# Patient Record
Sex: FEMALE | Race: WHITE
[De-identification: names, ages, dates, MRNs, and addresses within clinical notes are randomized per-mention and may not be internally consistent; named-entity substitution may affect disease eponyms.]

---

## 2018-08-05 ENCOUNTER — HOSPITAL ENCOUNTER (EMERGENCY)
Dept: HOSPITAL 62 - ER | Age: 18
LOS: 1 days | Discharge: HOME | End: 2018-08-06
Payer: OTHER GOVERNMENT

## 2018-08-05 DIAGNOSIS — Z88.8: ICD-10-CM

## 2018-08-05 DIAGNOSIS — O21.9: Primary | ICD-10-CM

## 2018-08-05 DIAGNOSIS — Z3A.00: ICD-10-CM

## 2018-08-05 LAB
ADD MANUAL DIFF: NO
ALBUMIN SERPL-MCNC: 4 G/DL (ref 3.5–5)
ALP SERPL-CCNC: 54 U/L (ref 38–126)
ALT SERPL-CCNC: 22 U/L (ref 9–52)
ANION GAP SERPL CALC-SCNC: 13 MMOL/L (ref 5–19)
APPEARANCE UR: (no result)
APTT PPP: YELLOW S
AST SERPL-CCNC: 17 U/L (ref 14–36)
BASOPHILS # BLD AUTO: 0.1 10^3/UL (ref 0–0.2)
BASOPHILS NFR BLD AUTO: 0.6 % (ref 0–2)
BILIRUB DIRECT SERPL-MCNC: 0.2 MG/DL (ref 0–0.4)
BILIRUB SERPL-MCNC: 0.3 MG/DL (ref 0.2–1.3)
BILIRUB UR QL STRIP: NEGATIVE
BUN SERPL-MCNC: 7 MG/DL (ref 7–20)
CALCIUM: 9.3 MG/DL (ref 8.4–10.2)
CHLORIDE SERPL-SCNC: 105 MMOL/L (ref 98–107)
CO2 SERPL-SCNC: 22 MMOL/L (ref 22–30)
EOSINOPHIL # BLD AUTO: 0.1 10^3/UL (ref 0–0.6)
EOSINOPHIL NFR BLD AUTO: 1.1 % (ref 0–6)
ERYTHROCYTE [DISTWIDTH] IN BLOOD BY AUTOMATED COUNT: 13.4 % (ref 11.5–14)
GLUCOSE SERPL-MCNC: 79 MG/DL (ref 75–110)
GLUCOSE UR STRIP-MCNC: NEGATIVE MG/DL
HCT VFR BLD CALC: 38.9 % (ref 36–47)
HGB BLD-MCNC: 13.3 G/DL (ref 12–15.5)
KETONES UR STRIP-MCNC: 20 MG/DL
LIPASE SERPL-CCNC: 52.4 U/L (ref 23–300)
LYMPHOCYTES # BLD AUTO: 3.4 10^3/UL (ref 0.5–4.7)
LYMPHOCYTES NFR BLD AUTO: 33.5 % (ref 13–45)
MCH RBC QN AUTO: 29 PG (ref 27–33.4)
MCHC RBC AUTO-ENTMCNC: 34.2 G/DL (ref 32–36)
MCV RBC AUTO: 85 FL (ref 80–97)
MONOCYTES # BLD AUTO: 0.8 10^3/UL (ref 0.1–1.4)
MONOCYTES NFR BLD AUTO: 7.7 % (ref 3–13)
NEUTROPHILS # BLD AUTO: 5.8 10^3/UL (ref 1.7–8.2)
NEUTS SEG NFR BLD AUTO: 57.1 % (ref 42–78)
NITRITE UR QL STRIP: NEGATIVE
PH UR STRIP: 7 [PH] (ref 5–9)
PLATELET # BLD: 271 10^3/UL (ref 150–450)
POTASSIUM SERPL-SCNC: 4 MMOL/L (ref 3.6–5)
PROT SERPL-MCNC: 7.3 G/DL (ref 6.3–8.2)
PROT UR STRIP-MCNC: NEGATIVE MG/DL
RBC # BLD AUTO: 4.6 10^6/UL (ref 3.72–5.28)
SODIUM SERPL-SCNC: 140.1 MMOL/L (ref 137–145)
SP GR UR STRIP: 1.02
TOTAL CELLS COUNTED % (AUTO): 100 %
UROBILINOGEN UR-MCNC: NEGATIVE MG/DL (ref ?–2)
WBC # BLD AUTO: 10.2 10^3/UL (ref 4–10.5)

## 2018-08-05 PROCEDURE — 76705 ECHO EXAM OF ABDOMEN: CPT

## 2018-08-05 PROCEDURE — 96374 THER/PROPH/DIAG INJ IV PUSH: CPT

## 2018-08-05 PROCEDURE — 96361 HYDRATE IV INFUSION ADD-ON: CPT

## 2018-08-05 PROCEDURE — 36415 COLL VENOUS BLD VENIPUNCTURE: CPT

## 2018-08-05 PROCEDURE — 99284 EMERGENCY DEPT VISIT MOD MDM: CPT

## 2018-08-05 PROCEDURE — 85025 COMPLETE CBC W/AUTO DIFF WBC: CPT

## 2018-08-05 PROCEDURE — 83690 ASSAY OF LIPASE: CPT

## 2018-08-05 PROCEDURE — 81001 URINALYSIS AUTO W/SCOPE: CPT

## 2018-08-05 PROCEDURE — 80053 COMPREHEN METABOLIC PANEL: CPT

## 2018-08-05 NOTE — ER DOCUMENT REPORT
ED Medical Screen (RME)





- General


Chief Complaint: Nausea/Vomiting


Stated Complaint: VOMITING


Time Seen by Provider: 08/05/18 20:25


Notes: 





18-year-old female is 10 weeks pregnant presents the emergency department 

initially stating that she has a migraine headache and has vomiting for the 

past 4 days.  Patient later states that she has never actually had a migraine 

but she thinks it must be migraine because she has photosensitivity with it.  

States that the headache is located around her eyes.  Complains of neck 

stiffness but then proceeds to range her neck through full range of motion 

without any difficulty.





Patient does complain of some streaks of blood and some clots in her vomit but 

when asked to describe this further she states that the mashed potatoes were 

red when she threw them up earlier.





Patient also states that she is "immune" to Tylenol and Benadryl.





Denies abdominal pain, vaginal bleeding or dysuria.


TRAVEL OUTSIDE OF THE [place] IN LAST 30 DAYS: No





- Related Data


Allergies/Adverse Reactions: 


 





sertraline [From Zoloft] Allergy (Verified 08/05/18 20:23)


 











Past Medical History





- General


Information source: Patient


Renal/ Medical History: Denies: Hx Peritoneal Dialysis





Review of Systems





- Review of Systems


Constitutional: See HPI


Gastrointestinal: See HPI


Female Genitourinary: See HPI





Physical Exam





- Vital signs


Vitals: 





 











Temp Pulse Resp BP Pulse Ox


 


 98.5 F   86   18   140/84 H  99 


 


 08/05/18 20:24  08/05/18 20:24  08/05/18 20:24  08/05/18 20:24  08/05/18 20:24











Interpretation: Normal





- Notes


Notes: 





Alert, oriented, diaphoretic, no acute distress, full range of motion of neck 

without any difficulty, no evidence of meningismus.  No shortness of breath.  

Heart was regular rate and rhythm with no murmurs gallops or rubs, lungs were 

clear to auscultation bilaterally, abdomen was nontender to palpation while 

sitting up in the chair.





Course





- Vital Signs


Vital signs: 





 











Temp Pulse Resp BP Pulse Ox


 


 98.5 F   86   18   140/84 H  99 


 


 08/05/18 20:24  08/05/18 20:24  08/05/18 20:24  08/05/18 20:24  08/05/18 20:24

## 2018-08-05 NOTE — RADIOLOGY REPORT (SQ)
EXAM DESCRIPTION:  U/S ABDOMEN LIMITED W/O DOP



COMPLETED DATE/TIME:  8/5/2018 10:03 pm



REASON FOR STUDY:  upper abd, epig pain



COMPARISON:  None.



TECHNIQUE:  Dynamic and static grayscale images acquired of the right upper quadrant and recorded on 
PACS. Additional selected color Doppler and spectral images recorded.



LIMITATIONS:  Study limited due to acoustical interference from fat or from air in the bowel.



FINDINGS:  PANCREAS: Visualized pancreas and duct normal.  Parts of pancreas poorly seen secondary to
 acoustical interference from fat or from air in the bowel.

LIVER: No masses. Echotexture normal.

LIVER VASCULATURE: Normal directional flow of the main portal vein and hepatic veins.

GALLBLADDER: No stones. Normal wall thickness. No pericholecystic fluid.

ULTRASOUND-DETECTED BRANDON'S SIGN: Negative.

INTRAHEPATIC DUCTS AND COMMON DUCT: CBD and intrahepatic ducts normal caliber. No filling defects.

INFERIOR VENA CAVA: Normal flow.

AORTA: No aneurysm.

RIGHT KIDNEY:  Normal size. Normal echogenicity. No solid or suspicious masses. No hydronephrosis. No
 calcifications.

PERITONEAL CAVITY AND RIGHT PLEURAL SPACE: No ascites or effusions.

OTHER: Fetal heart rate 169 beats per minute.



IMPRESSION:  No acute findings in the right upper quadrant.Fetal heart rate 169 beats per minute.



TECHNICAL DOCUMENTATION:  JOB ID:  [id number]

[id number]

 2011 [employer]- All Rights Reserved



Reading location - IP/workstation name: [email or web address]

## 2018-08-05 NOTE — ER DOCUMENT REPORT
ED General





- General


Chief Complaint: Nausea/Vomiting


Stated Complaint: VOMITING


Time Seen by Provider: 18 20:25


Mode of Arrival: Ambulatory


Information source: Patient


Notes: 





Patient is currently 10 weeks pregnant .  Patient states that she has had 

upper abdominal pain and back tenderness for the past 5 days with nausea and 

vomiting that started 4 days ago.  Patient states yesterday she started to 

develop a headache.  Patient does report some light sensitivity.  Patient 

denies any vaginal bleeding or dysuria.  Patient denies any fever.  Patient 

states she has been unable to keep any food or liquids down.


TRAVEL OUTSIDE OF THE [place] IN LAST 30 DAYS: No





- HPI


Onset: Other - 5 days


Onset/Duration: Persistent


Quality of pain: Achy


Pain Level: 3


Associated symptoms: Headache, Nausea, Vomiting.  denies: Nonproductive cough, 

Productive cough, Fever


Exacerbated by: Denies


Relieved by: Denies


Similar symptoms previously: No


Recently seen / treated by doctor: No





- Related Data


Allergies/Adverse Reactions: 


 





sertraline [From Zoloft] Allergy (Verified 18 20:23)


 











Past Medical History





- General


Information source: Patient





- Social History


Smoking Status: Never Smoker


Frequency of alcohol use: None


Drug Abuse: None


Occupation: None


Lives with: Spouse/Significant other


Family History: Reviewed & Not Pertinent


Patient has suicidal ideation: No


Patient has homicidal ideation: No





- Medical History


Medical History: Negative


Renal/ Medical History: Denies: Hx Peritoneal Dialysis


Past Surgical History: Reports: Hx Oral Surgery, Hx Tonsillectomy





Review of Systems





- Review of Systems


Constitutional: No symptoms reported.  denies: Fever, Recent illness


EENT: No symptoms reported


Cardiovascular: No symptoms reported


Respiratory: No symptoms reported.  denies: Cough


Gastrointestinal: Abdominal pain, Nausea, Vomiting, Poor fluid intake.  denies: 

Diarrhea


Genitourinary: No symptoms reported.  denies: Dysuria


Female Genitourinary: Pregnant.  denies: Vaginal discharge, Vaginal bleeding


Musculoskeletal: Back pain


Skin: No symptoms reported


Hematologic/Lymphatic: No symptoms reported


Neurological/Psychological: Headaches





Physical Exam





- Vital signs


Vitals: 


 











Temp Pulse Resp BP Pulse Ox


 


 98.5 F   86   18   140/84 H  99 


 


 18 20:24  18 20:24  18 20:24  18 20:24  18 20:24














- General


General appearance: Appears well, Alert


In distress: None





- HEENT


Head: Normocephalic, Atraumatic


Eyes: Normal


Conjunctiva: Normal


Pupils: PERRL


Ears: Normal


External canal: Normal


Tympanic membrane: Normal


Nasal: Normal


Mouth/Lips: Normal


Mucous membranes: Normal


Neck: Normal, Supple.  No: Lymphadenopathy, Meningismus





- Respiratory


Respiratory status: No respiratory distress


Chest status: Nontender


Breath sounds: Normal.  No: Productive cough, Rales, Rhonchi, Stridor, Wheezing


Chest palpation: Normal





- Cardiovascular


Rhythm: Regular


Heart sounds: S1 appreciated, S2 appreciated


Murmur: No





- Abdominal


Inspection: Obese


Distension: No distension


Bowel sounds: Normal


Tenderness: Tender - epig, RUQ, LUQ.  No: Guarding


Organomegaly: No organomegaly





- Back


Back: Tender - lower thoracic paraspinal tenderness.  No: CVA tenderness





- Extremities


General upper extremity: Normal inspection, Normal ROM


General lower extremity: Normal inspection, Normal ROM





- Neurological


Neuro grossly intact: Yes


Cognition: Normal


[place] Coma Scale Eye Opening: Spontaneous


[place] Coma Scale Verbal: Oriented


Denise Coma Scale Motor: Obeys Commands


Denise Coma Scale Total: 15


Speech: Normal


Cranial nerves: Normal


Motor strength normal: LUE, RUE, LLE, RLE





- Psychological


Associated symptoms: Normal affect, Normal mood





- Skin


Skin Temperature: Warm


Skin Moisture: Dry


Skin Color: Normal





Course





- Re-evaluation


Re-evalutation: 





18 22:11


Patient states that she continues to feel nauseous, additional medication 

ordered.


18 22:32


Fetal heart rate on ultrasound noted at 169.


18 23:38


Patient without any vomiting during ER stay.  Abdomen is soft without any 

guarding.  Patient encouraged to follow-up with her OB/GYN for recheck.  

Patient presents with abdominal pain without signs of peritonitis or other life-

threatening or serious etiology.  Patient appears stable for discharge and has 

been instructed to return immediately if the symptoms worsen in any way, or in 8

-12 hours if not improved for reevaluation.  The patient has been instructed to 

return if the symptoms worsen or change in any way.





- Vital Signs


Vital signs: 


 











Temp Pulse Resp BP Pulse Ox


 


 98.5 F   86   18   140/84 H  99 


 


 18 20:24  18 20:24  18 20:24  18 20:24  18 20:24














- Laboratory


Result Diagrams: 


 18 20:45





 18 20:45


Laboratory results interpreted by me: 


 











  18





  20:50


 


Urine Ketones  20 H














 Labs- Entire Visit











  18





  20:45 20:45 20:50


 


WBC  10.2  


 


RBC  4.60  


 


Hgb  13.3  


 


Hct  38.9  


 


MCV  85  


 


MCH  29.0  


 


MCHC  34.2  


 


RDW  13.4  


 


Plt Count  271  


 


Seg Neutrophils %  57.1  


 


Lymphocytes %  33.5  


 


Monocytes %  7.7  


 


Eosinophils %  1.1  


 


Basophils %  0.6  


 


Absolute Neutrophils  5.8  


 


Absolute Lymphocytes  3.4  


 


Absolute Monocytes  0.8  


 


Absolute Eosinophils  0.1  


 


Absolute Basophils  0.1  


 


Sodium   140.1 


 


Potassium   4.0 


 


Chloride   105 


 


Carbon Dioxide   22 


 


Anion Gap   13 


 


BUN   7 


 


Creatinine   0.55 


 


Est GFR ( Amer)   > 60 


 


Est GFR (Non-Af Amer)   > 60 


 


Glucose   79 


 


Calcium   9.3 


 


Total Bilirubin   0.3 


 


Direct Bilirubin   0.2 


 


Neonat Total Bilirubin   Not Reportable 


 


Neonat Direct Bilirubin   Not Reportable 


 


Neonat Indirect Bili   Not Reportable 


 


AST   17 


 


ALT   22 


 


Alkaline Phosphatase   54 


 


Total Protein   7.3 


 


Albumin   4.0 


 


Lipase   52.4 


 


Urine Color    YELLOW


 


Urine Appearance    SLIGHTLY-CLOUDY


 


Urine pH    7.0


 


Ur Specific Gravity    1.021


 


Urine Protein    NEGATIVE


 


Urine Glucose (UA)    NEGATIVE


 


Urine Ketones    20 H


 


Urine Blood    NEGATIVE


 


Urine Nitrite    NEGATIVE


 


Urine Bilirubin    NEGATIVE


 


Urine Urobilinogen    NEGATIVE


 


Ur Leukocyte Esterase    NEGATIVE


 


Urine WBC (Auto)    2


 


Urine RBC (Auto)    1


 


U Hyaline Cast (Auto)    1


 


Squamous Epi Cells Auto    9


 


Urine Mucus (Auto)    OCC


 


Urine Yeast (Budding)    PRESENT


 


Urine Ascorbic Acid    NEGATIVE














- Diagnostic Test


Radiology reviewed: Reports reviewed





Discharge





- Discharge


Clinical Impression: 


 Epigastric abdominal pain





Headache


Qualifiers:


 Headache type: unspecified Headache chronicity pattern: acute headache 

Intractability: not intractable Qualified Code(s): R51 - Headache





Nausea & vomiting


Qualifiers:


 Vomiting type: unspecified Vomiting Intractability: non-intractable Qualified 

Code(s): R11.2 - Nausea with vomiting, unspecified





Pregnancy


Qualifiers:


 Weeks of gestation: 10 weeks Qualified Code(s): Z3A.10 - 10 weeks gestation of 

pregnancy





Condition: Stable


Disposition: HOME, SELF-CARE


Instructions:  Abdominal Pain ([facility]), Intravenous (IV) Fluids ([facility]), Vomiting (

[facility])


Additional Instructions: 


Return immediately for any new or worsening symptoms





Followup with your ob/gyn provider, call tomorrow to make a followup appointment





You may take Benadryl over-the-counter as directed to help with your nausea 

symptoms.


Prescriptions: 


Promethazine HCl [Phenergan 25 mg Tablet] 25 mg PO Q6H PRN #15 tablet


 PRN Reason: 


Referrals: 


[facility] [Provider Group] - Follow up tomorrow

## 2018-08-06 VITALS — DIASTOLIC BLOOD PRESSURE: 68 MMHG | SYSTOLIC BLOOD PRESSURE: 103 MMHG

## 2018-08-08 ENCOUNTER — HOSPITAL ENCOUNTER (EMERGENCY)
Dept: HOSPITAL 62 - ER | Age: 18
Discharge: HOME | End: 2018-08-08
Payer: OTHER GOVERNMENT

## 2018-08-08 VITALS — DIASTOLIC BLOOD PRESSURE: 42 MMHG | SYSTOLIC BLOOD PRESSURE: 90 MMHG

## 2018-08-08 DIAGNOSIS — O21.9: Primary | ICD-10-CM

## 2018-08-08 DIAGNOSIS — Z3A.00: ICD-10-CM

## 2018-08-08 DIAGNOSIS — Z88.8: ICD-10-CM

## 2018-08-08 LAB
ADD MANUAL DIFF: NO
ALBUMIN SERPL-MCNC: 4 G/DL (ref 3.5–5)
ALP SERPL-CCNC: 58 U/L (ref 38–126)
ALT SERPL-CCNC: 22 U/L (ref 9–52)
ANION GAP SERPL CALC-SCNC: 13 MMOL/L (ref 5–19)
APPEARANCE UR: (no result)
APTT PPP: YELLOW S
AST SERPL-CCNC: 18 U/L (ref 14–36)
BASOPHILS # BLD AUTO: 0 10^3/UL (ref 0–0.2)
BASOPHILS NFR BLD AUTO: 0.2 % (ref 0–2)
BILIRUB DIRECT SERPL-MCNC: 0.2 MG/DL (ref 0–0.4)
BILIRUB SERPL-MCNC: 0.3 MG/DL (ref 0.2–1.3)
BILIRUB UR QL STRIP: NEGATIVE
BUN SERPL-MCNC: 7 MG/DL (ref 7–20)
CALCIUM: 9.3 MG/DL (ref 8.4–10.2)
CHLORIDE SERPL-SCNC: 106 MMOL/L (ref 98–107)
CO2 SERPL-SCNC: 22 MMOL/L (ref 22–30)
EOSINOPHIL # BLD AUTO: 0.1 10^3/UL (ref 0–0.6)
EOSINOPHIL NFR BLD AUTO: 1.4 % (ref 0–6)
ERYTHROCYTE [DISTWIDTH] IN BLOOD BY AUTOMATED COUNT: 13.6 % (ref 11.5–14)
GLUCOSE SERPL-MCNC: 87 MG/DL (ref 75–110)
GLUCOSE UR STRIP-MCNC: NEGATIVE MG/DL
HCT VFR BLD CALC: 36.7 % (ref 36–47)
HGB BLD-MCNC: 12.5 G/DL (ref 12–15.5)
KETONES UR STRIP-MCNC: NEGATIVE MG/DL
LYMPHOCYTES # BLD AUTO: 3 10^3/UL (ref 0.5–4.7)
LYMPHOCYTES NFR BLD AUTO: 28.8 % (ref 13–45)
MCH RBC QN AUTO: 28.9 PG (ref 27–33.4)
MCHC RBC AUTO-ENTMCNC: 34.2 G/DL (ref 32–36)
MCV RBC AUTO: 85 FL (ref 80–97)
MONOCYTES # BLD AUTO: 0.8 10^3/UL (ref 0.1–1.4)
MONOCYTES NFR BLD AUTO: 8.2 % (ref 3–13)
NEUTROPHILS # BLD AUTO: 6.4 10^3/UL (ref 1.7–8.2)
NEUTS SEG NFR BLD AUTO: 61.4 % (ref 42–78)
NITRITE UR QL STRIP: NEGATIVE
PH UR STRIP: 5 [PH] (ref 5–9)
PLATELET # BLD: 257 10^3/UL (ref 150–450)
POTASSIUM SERPL-SCNC: 4.1 MMOL/L (ref 3.6–5)
PROT SERPL-MCNC: 7.1 G/DL (ref 6.3–8.2)
PROT UR STRIP-MCNC: NEGATIVE MG/DL
RBC # BLD AUTO: 4.34 10^6/UL (ref 3.72–5.28)
SODIUM SERPL-SCNC: 141.4 MMOL/L (ref 137–145)
SP GR UR STRIP: 1.03
TOTAL CELLS COUNTED % (AUTO): 100 %
UROBILINOGEN UR-MCNC: 2 MG/DL (ref ?–2)
WBC # BLD AUTO: 10.4 10^3/UL (ref 4–10.5)

## 2018-08-08 PROCEDURE — 85025 COMPLETE CBC W/AUTO DIFF WBC: CPT

## 2018-08-08 PROCEDURE — 36415 COLL VENOUS BLD VENIPUNCTURE: CPT

## 2018-08-08 PROCEDURE — 81001 URINALYSIS AUTO W/SCOPE: CPT

## 2018-08-08 PROCEDURE — 99284 EMERGENCY DEPT VISIT MOD MDM: CPT

## 2018-08-08 PROCEDURE — 96360 HYDRATION IV INFUSION INIT: CPT

## 2018-08-08 PROCEDURE — 80053 COMPREHEN METABOLIC PANEL: CPT
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
## 2020-01-06 ENCOUNTER — HOSPITAL ENCOUNTER (EMERGENCY)
Dept: HOSPITAL 62 - ER | Age: 20
Discharge: HOME | End: 2020-01-06
Payer: OTHER GOVERNMENT

## 2020-01-06 VITALS — SYSTOLIC BLOOD PRESSURE: 125 MMHG | DIASTOLIC BLOOD PRESSURE: 69 MMHG

## 2020-01-06 DIAGNOSIS — J02.9: ICD-10-CM

## 2020-01-06 DIAGNOSIS — B08.4: Primary | ICD-10-CM

## 2020-01-06 DIAGNOSIS — M79.10: ICD-10-CM

## 2020-01-06 DIAGNOSIS — R68.83: ICD-10-CM

## 2020-01-06 DIAGNOSIS — R05: ICD-10-CM

## 2020-01-06 PROCEDURE — 99283 EMERGENCY DEPT VISIT LOW MDM: CPT

## 2020-01-06 PROCEDURE — 87880 STREP A ASSAY W/OPTIC: CPT

## 2020-01-06 PROCEDURE — 87070 CULTURE OTHR SPECIMN AEROBIC: CPT

## 2020-01-07 ENCOUNTER — HOSPITAL ENCOUNTER (EMERGENCY)
Dept: HOSPITAL 62 - ER | Age: 20
Discharge: HOME | End: 2020-01-07
Payer: OTHER GOVERNMENT

## 2020-01-07 VITALS — DIASTOLIC BLOOD PRESSURE: 79 MMHG | SYSTOLIC BLOOD PRESSURE: 139 MMHG

## 2020-01-07 DIAGNOSIS — B08.4: Primary | ICD-10-CM

## 2020-01-07 DIAGNOSIS — Z88.8: ICD-10-CM

## 2020-01-07 PROCEDURE — 99283 EMERGENCY DEPT VISIT LOW MDM: CPT

## 2020-01-07 NOTE — ER DOCUMENT REPORT
ED Skin Rash/Insect Bite/Abscs





- General


Chief Complaint: Skin Problem


Stated Complaint: THORAT SWELLING/SKIN ISSUE


Time Seen by Provider: 20 14:05


Primary Care Provider: 


[facility] [Provider Group] - Follow up as needed


[facility] [Provider Group] - Follow up as needed


[facility] [Provider Group] - Follow up as needed


[facility] [Provider Group] - Follow up as needed


MIKY JULIAN MD [ACTIVE STAFF] - Follow up as needed


Mode of Arrival: Ambulatory


Information source: Patient


Notes: 





20-year-old female presents to ED for pain to the hands and feet.  She was 

recently diagnosed with hand-foot-and-mouth and she does have lesions to the 

hands feet perineum and mouth.  She is on Magic mouthwash for the mouth.  She 

states she has been afebrile for a while.  She was just in here to see if there 

was something she could get for her pain in her feet.  There are no open and or 

infected lesions to the feet or hands at this time.  


TRAVEL OUTSIDE OF THE [place] IN LAST 30 DAYS: No





- HPI


Patient complains to provider of: Skin rash/lesion


Onset: Other - Several days she was diagnosed with hand-foot-and-mouth yesterday


Onset/Duration: Persistent, Worse


Quality of pain: Burning, Sharp


Severity: Moderate


Pain Level: 3


Skin Character: Macules, Papules


Quality of rash: Painful


Identify cause: Yes


Exacerbated by: Walking


Relieved by: Denies


Similar symptoms previously: Yes


Recently seen / treated by doctor: Yes





- Related Data


Allergies/Adverse Reactions: 


                                        





metoclopramide [From Reglan] Allergy (Verified 20 14:05)


   


sertraline [From Zoloft] Allergy (Verified 20 14:05)


   











Past Medical History





- General


Information source: Patient





- Social History


Smoking Status: Never Smoker


Frequency of alcohol use: None


Drug Abuse: None


Lives with: Family


Family History: Reviewed & Not Pertinent


Patient has suicidal ideation: No


Patient has homicidal ideation: No





- Past Medical History


Cardiac Medical History: Reports: None


Pulmonary Medical History: Reports: None


EENT Medical History: Reports: None


Neurological Medical History: Reports: None


Endocrine Medical History: Reports: None


Renal/ Medical History: Reports: None


Malignancy Medical History: Reports: None


GI Medical History: Reports: None


Musculoskeletal Medical History: Reports None


Skin Medical History: Reports None


Psychiatric Medical History: Reports: None


Traumatic Medical History: Reports: None


Infectious Medical History: Reports: None


Past Surgical History: Reports: Hx  Section, Hx Oral Surgery, Hx 

Tonsillectomy





- Immunizations


Immunizations up to date: Yes





Review of Systems





- Review of Systems


Constitutional: No symptoms reported


EENT: Mouth pain, Other - Hand-foot and mouth


Cardiovascular: No symptoms reported


Respiratory: No symptoms reported


Gastrointestinal: No symptoms reported


Genitourinary: No symptoms reported


Female Genitourinary: No symptoms reported


Musculoskeletal: No symptoms reported


Skin: Other - Lesions to both hands and both feet


Hematologic/Lymphatic: No symptoms reported


Neurological/Psychological: No symptoms reported


-: Yes All other systems reviewed and negative





Physical Exam





- Vital signs


Vitals: 


                                        











Temp Pulse Resp BP Pulse Ox


 


 98.7 F   104 H  16   139/79 H  99 


 


 20 13:59  20 13:59  20 13:59  20 13:59  20 13:59











Interpretation: Normal





- General


General appearance: Appears well, Alert





- HEENT


Head: Normocephalic, Atraumatic


Eyes: Normal


Pupils: PERRL





- Respiratory


Respiratory status: No respiratory distress


Chest status: Nontender


Breath sounds: Normal


Chest palpation: Normal





- Cardiovascular


Rhythm: Regular


Heart sounds: Normal auscultation


Murmur: No





- Abdominal


Inspection: Normal


Distension: No distension


Bowel sounds: Normal


Tenderness: Nontender


Organomegaly: No organomegaly





- Back


Back: Normal, Nontender





- Extremities


General upper extremity: Normal inspection, Nontender, Normal color, Normal ROM,

Normal temperature


General lower extremity: Normal inspection, Nontender, Normal color, Normal ROM,

Normal temperature, Normal weight bearing.  No: Maribel's sign





- Neurological


Neuro grossly intact: Yes


Cognition: Normal


Orientation: AAOx4


Denise Coma Scale Eye Opening: Spontaneous


[place] Coma Scale Verbal: Oriented


[place] Coma Scale Motor: Obeys Commands


[place] Coma Scale Total: 15


Speech: Normal


Motor strength normal: LUE, RUE, LLE, RLE


Sensory: Normal





- Psychological


Associated symptoms: Normal affect, Normal mood





- Skin


Skin Temperature: Warm


Skin Moisture: Dry


Skin Color: Normal


Skin irregularity: Lesion - Both hands and both feet


Location of irregularity: Extremities - Hands feet perineum


Character of irregularity: Maculopapular, Erythematous


Irregularity with: Tenderness





Course





- Vital Signs


Vital signs: 


                                        











Temp Pulse Resp BP Pulse Ox


 


 98.7 F   104 H  16   139/79 H  99 


 


 20 13:59  20 13:59  20 13:59  20 13:59  20 13:59














Discharge





- Discharge


Clinical Impression: 


 Hand, foot and mouth disease (HFMD)





Condition: Stable


Disposition: HOME, SELF-CARE


Additional Instructions: 


Hand, Foot and Mouth Disease





     Hand, Foot, and Mouth Disease (HFM) is caused by a virus. Symptoms include 

small ulcers in the mouth and spots or blisters on the palms, feet, or buttocks.

A low grade fever for 2-3 days is common. The skin and mouth sores may last for 

7-10 days. Hand, Foot, and Mouth Disease is contagious until one day after the 

fever is gone.


     Most of the time, symptoms are mild. If fluids are avoided due to painful 

mouth sores, dehydration may result. You can use oral anesthetics (Oragel, 

Anbesol) or liquid Benadryl to numb mouth sores. Use acetaminophen for pain and 

fever. Use cool liquids and foods that are easily chewed. Avoid citrus juices 

and spicy foods.


     To prevent spread of the virus, use good handwashing. Shared toys should be

cleaned with disinfectant. Clean the toilets, sinks, and counter surfaces in 

bathrooms. Launder clothing in hot water.


     Return if there is a significant change for the worse, including high 

fever, severe pain, or dehydration. Signs of dehydration in a child can include 

progressive weakness, apathy, irritability, or no diaper wetting for over eight 

hours.








Taking your naproxen as ordered.  Continue taking your Magic mouthwash for the 

pain in your mouth.  You can also get lidocaine cream or gel for your feet and 

hands if they become painful.  This is just for the pain please follow the 

instructions on the package.  Mix the lidocaine cream in with Eucerin cream and 

use on both hands and both feet for the pain








FOLLOW-UP CARE:


If you have been referred to a physician for follow-up care, call the 

physicians office for an appointment as you were instructed or within the next 

two days.  If you experience worsening or a significant change in your symptoms,

notify the physician immediately or return to the Emergency Department at any 

time for re-evaluation.


Forms:  Elevated Blood Pressure


Referrals: 


[facility] [Provider Group] - Follow up as needed


[facility] [Provider Group] - Follow up as needed


[facility] [Provider Group] - Follow up as needed


[facility] [Provider Group] - Follow up as needed


MIKY JULIAN MD [ACTIVE STAFF] - Follow up as needed

## 2024-02-11 ENCOUNTER — HOSPITAL ENCOUNTER (EMERGENCY)
Facility: HOSPITAL | Age: 24
Discharge: HOME | End: 2024-02-11
Payer: COMMERCIAL

## 2024-02-11 ENCOUNTER — APPOINTMENT (OUTPATIENT)
Facility: HOSPITAL | Age: 24
End: 2024-02-11
Payer: COMMERCIAL

## 2024-02-11 VITALS
DIASTOLIC BLOOD PRESSURE: 79 MMHG | SYSTOLIC BLOOD PRESSURE: 131 MMHG | RESPIRATION RATE: 18 BRPM | BODY MASS INDEX: 35.22 KG/M2 | OXYGEN SATURATION: 97 % | TEMPERATURE: 98 F | HEART RATE: 69 BPM | WEIGHT: 246 LBS | HEIGHT: 70 IN

## 2024-02-11 DIAGNOSIS — N83.201 CYST OF RIGHT OVARY: ICD-10-CM

## 2024-02-11 DIAGNOSIS — N93.8 DUB (DYSFUNCTIONAL UTERINE BLEEDING): Primary | ICD-10-CM

## 2024-02-11 LAB
ALBUMIN SERPL-MCNC: 3.8 G/DL (ref 3.4–5)
ALBUMIN/GLOB SERPL: 1.1 (ref 0.8–1.7)
ALP SERPL-CCNC: 89 U/L (ref 45–117)
ALT SERPL-CCNC: 38 U/L (ref 13–56)
ANION GAP SERPL CALC-SCNC: 3 MMOL/L (ref 3–18)
APPEARANCE UR: CLEAR
AST SERPL-CCNC: 20 U/L (ref 10–38)
BACTERIA URNS QL MICRO: NEGATIVE /HPF
BASOPHILS # BLD: 0.1 K/UL (ref 0–0.1)
BASOPHILS NFR BLD: 1 % (ref 0–2)
BILIRUB SERPL-MCNC: 0.4 MG/DL (ref 0.2–1)
BILIRUB UR QL: NEGATIVE
BUN SERPL-MCNC: 13 MG/DL (ref 7–18)
BUN/CREAT SERPL: 14 (ref 12–20)
CALCIUM SERPL-MCNC: 9.2 MG/DL (ref 8.5–10.1)
CHLORIDE SERPL-SCNC: 111 MMOL/L (ref 100–111)
CO2 SERPL-SCNC: 28 MMOL/L (ref 21–32)
COLOR UR: ABNORMAL
CREAT SERPL-MCNC: 0.93 MG/DL (ref 0.6–1.3)
DIFFERENTIAL METHOD BLD: ABNORMAL
EOSINOPHIL # BLD: 0.3 K/UL (ref 0–0.4)
EOSINOPHIL NFR BLD: 3 % (ref 0–5)
EPITH CASTS URNS QL MICRO: NORMAL /LPF (ref 0–5)
ERYTHROCYTE [DISTWIDTH] IN BLOOD BY AUTOMATED COUNT: 13.2 % (ref 11.6–14.5)
GLOBULIN SER CALC-MCNC: 3.6 G/DL (ref 2–4)
GLUCOSE SERPL-MCNC: 98 MG/DL (ref 74–99)
GLUCOSE UR STRIP.AUTO-MCNC: NEGATIVE MG/DL
HCG SERPL QL: NEGATIVE
HCT VFR BLD AUTO: 38.2 % (ref 35–45)
HGB BLD-MCNC: 12.5 G/DL (ref 12–16)
HGB UR QL STRIP: ABNORMAL
IMM GRANULOCYTES # BLD AUTO: 0 K/UL (ref 0–0.04)
IMM GRANULOCYTES NFR BLD AUTO: 0 % (ref 0–0.5)
KETONES UR QL STRIP.AUTO: NEGATIVE MG/DL
LEUKOCYTE ESTERASE UR QL STRIP.AUTO: ABNORMAL
LYMPHOCYTES # BLD: 4.1 K/UL (ref 0.9–3.6)
LYMPHOCYTES NFR BLD: 35 % (ref 21–52)
MCH RBC QN AUTO: 28.9 PG (ref 24–34)
MCHC RBC AUTO-ENTMCNC: 32.7 G/DL (ref 31–37)
MCV RBC AUTO: 88.2 FL (ref 78–100)
MONOCYTES # BLD: 1.1 K/UL (ref 0.05–1.2)
MONOCYTES NFR BLD: 9 % (ref 3–10)
NEUTS SEG # BLD: 6.2 K/UL (ref 1.8–8)
NEUTS SEG NFR BLD: 53 % (ref 40–73)
NITRITE UR QL STRIP.AUTO: NEGATIVE
NRBC # BLD: 0 K/UL (ref 0–0.01)
NRBC BLD-RTO: 0 PER 100 WBC
OTHER: NORMAL
PH UR STRIP: 6 (ref 5–8)
PLATELET # BLD AUTO: 305 K/UL (ref 135–420)
PMV BLD AUTO: 11.6 FL (ref 9.2–11.8)
POTASSIUM SERPL-SCNC: 3.5 MMOL/L (ref 3.5–5.5)
PROT SERPL-MCNC: 7.4 G/DL (ref 6.4–8.2)
PROT UR STRIP-MCNC: 100 MG/DL
RBC # BLD AUTO: 4.33 M/UL (ref 4.2–5.3)
RBC #/AREA URNS HPF: NORMAL /HPF (ref 0–5)
SODIUM SERPL-SCNC: 142 MMOL/L (ref 136–145)
SP GR UR REFRACTOMETRY: 1.03 (ref 1–1.03)
UROBILINOGEN UR QL STRIP.AUTO: 1 EU/DL (ref 0.2–1)
WBC # BLD AUTO: 11.9 K/UL (ref 4.6–13.2)
WBC URNS QL MICRO: NORMAL /HPF (ref 0–4)

## 2024-02-11 PROCEDURE — 85025 COMPLETE CBC W/AUTO DIFF WBC: CPT

## 2024-02-11 PROCEDURE — 6360000002 HC RX W HCPCS: Performed by: PHYSICIAN ASSISTANT

## 2024-02-11 PROCEDURE — 96375 TX/PRO/DX INJ NEW DRUG ADDON: CPT

## 2024-02-11 PROCEDURE — 76830 TRANSVAGINAL US NON-OB: CPT

## 2024-02-11 PROCEDURE — 87086 URINE CULTURE/COLONY COUNT: CPT

## 2024-02-11 PROCEDURE — 81001 URINALYSIS AUTO W/SCOPE: CPT

## 2024-02-11 PROCEDURE — 6370000000 HC RX 637 (ALT 250 FOR IP): Performed by: PHYSICIAN ASSISTANT

## 2024-02-11 PROCEDURE — 99284 EMERGENCY DEPT VISIT MOD MDM: CPT

## 2024-02-11 PROCEDURE — 96374 THER/PROPH/DIAG INJ IV PUSH: CPT

## 2024-02-11 PROCEDURE — 80053 COMPREHEN METABOLIC PANEL: CPT

## 2024-02-11 PROCEDURE — 84703 CHORIONIC GONADOTROPIN ASSAY: CPT

## 2024-02-11 RX ORDER — OXYCODONE HYDROCHLORIDE AND ACETAMINOPHEN 5; 325 MG/1; MG/1
1 TABLET ORAL EVERY 6 HOURS PRN
Qty: 12 TABLET | Refills: 0 | Status: SHIPPED | OUTPATIENT
Start: 2024-02-11 | End: 2024-02-14

## 2024-02-11 RX ORDER — ONDANSETRON 4 MG/1
4 TABLET, ORALLY DISINTEGRATING ORAL 3 TIMES DAILY PRN
Qty: 21 TABLET | Refills: 0 | Status: SHIPPED | OUTPATIENT
Start: 2024-02-11

## 2024-02-11 RX ORDER — ONDANSETRON 2 MG/ML
4 INJECTION INTRAMUSCULAR; INTRAVENOUS
Status: COMPLETED | OUTPATIENT
Start: 2024-02-11 | End: 2024-02-11

## 2024-02-11 RX ORDER — KETOROLAC TROMETHAMINE 15 MG/ML
15 INJECTION, SOLUTION INTRAMUSCULAR; INTRAVENOUS
Status: COMPLETED | OUTPATIENT
Start: 2024-02-11 | End: 2024-02-11

## 2024-02-11 RX ORDER — OXYCODONE HYDROCHLORIDE AND ACETAMINOPHEN 5; 325 MG/1; MG/1
1 TABLET ORAL
Status: COMPLETED | OUTPATIENT
Start: 2024-02-11 | End: 2024-02-11

## 2024-02-11 RX ADMIN — OXYCODONE HYDROCHLORIDE AND ACETAMINOPHEN 1 TABLET: 5; 325 TABLET ORAL at 03:06

## 2024-02-11 RX ADMIN — KETOROLAC TROMETHAMINE 15 MG: 15 INJECTION, SOLUTION INTRAMUSCULAR; INTRAVENOUS at 03:06

## 2024-02-11 RX ADMIN — ONDANSETRON 4 MG: 2 INJECTION INTRAMUSCULAR; INTRAVENOUS at 03:06

## 2024-02-11 NOTE — ED TRIAGE NOTES
Pt to Ed reports vaginal bleeding x 1 month worse x 1 wk ago. Pt also endorses abdominal pain 10/10. Pt also endorses nausea, vomiting

## 2024-02-11 NOTE — ED PROVIDER NOTES
EMERGENCY DEPARTMENT HISTORY AND PHYSICAL EXAM    Date: 2/11/2024  Patient Name: Maria G Jasmine    History of Presenting Illness     Chief Complaint   Patient presents with    Vaginal Bleeding     Pt to Ed reports vaginal bleeding x 1 month worse x 1 wk ago. Pt also endorses abdominal pain 10/10. Pt also endorses nausea, vomiting          History Provided By: patient    Chief Complaint: vaginal bleeding   Duration: 1 month  Timing:  acute  Location: vaginal   Quality: cramping, heavy bleeding  Severity: moderate  Modifying Factors: none   Associated Symptoms: abd cramping       Additional History (Context): Maria G Jasmine is a 24 y.o. female with no documented PMH who presents with c/o 1 month of heavy vaginal bleeding and abdominal cramping described as labor pains.  Patient has an IUD in place that has been present for the past few years.  The patient states she normally has regular menses that are fairly light.  The patient has not yet been seen by her gynecologist for this issue.  No prior history of uterine fibroids or ovarian cyst noted.  No other complaints are reported at this time.    PCP: No primary care provider on file.    Current Facility-Administered Medications   Medication Dose Route Frequency Provider Last Rate Last Admin    ketorolac (TORADOL) injection 15 mg  15 mg IntraVENous NOW Karly Solis PA-C        ondansetron (ZOFRAN) injection 4 mg  4 mg IntraVENous NOW Karly Solis PA-C        oxyCODONE-acetaminophen (PERCOCET) 5-325 MG per tablet 1 tablet  1 tablet Oral NOW Karly Solis PA-C         Current Outpatient Medications   Medication Sig Dispense Refill    ondansetron (ZOFRAN-ODT) 4 MG disintegrating tablet Take 1 tablet by mouth 3 times daily as needed for Nausea or Vomiting 21 tablet 0    oxyCODONE-acetaminophen (PERCOCET) 5-325 MG per tablet Take 1 tablet by mouth every 6 hours as needed for Pain for up to 3 days. Intended supply: 3 days. Take lowest dose possible to

## 2024-02-12 LAB
BACTERIA SPEC CULT: NORMAL
SERVICE CMNT-IMP: NORMAL

## 2024-02-13 ENCOUNTER — APPOINTMENT (OUTPATIENT)
Facility: HOSPITAL | Age: 24
End: 2024-02-13
Payer: COMMERCIAL

## 2024-02-13 ENCOUNTER — HOSPITAL ENCOUNTER (EMERGENCY)
Facility: HOSPITAL | Age: 24
Discharge: HOME | End: 2024-02-14
Attending: EMERGENCY MEDICINE
Payer: COMMERCIAL

## 2024-02-13 DIAGNOSIS — N83.201 CYST OF RIGHT OVARY: Primary | ICD-10-CM

## 2024-02-13 DIAGNOSIS — N93.8 DUB (DYSFUNCTIONAL UTERINE BLEEDING): ICD-10-CM

## 2024-02-13 LAB
ALBUMIN SERPL-MCNC: 3.5 G/DL (ref 3.4–5)
ALBUMIN/GLOB SERPL: 1 (ref 0.8–1.7)
ALP SERPL-CCNC: 75 U/L (ref 45–117)
ALT SERPL-CCNC: 34 U/L (ref 13–56)
ANION GAP SERPL CALC-SCNC: 1 MMOL/L (ref 3–18)
AST SERPL-CCNC: 19 U/L (ref 10–38)
BASOPHILS # BLD: 0.1 K/UL (ref 0–0.1)
BASOPHILS NFR BLD: 0 % (ref 0–2)
BILIRUB SERPL-MCNC: 0.3 MG/DL (ref 0.2–1)
BUN SERPL-MCNC: 11 MG/DL (ref 7–18)
BUN/CREAT SERPL: 13 (ref 12–20)
CALCIUM SERPL-MCNC: 8.7 MG/DL (ref 8.5–10.1)
CHLORIDE SERPL-SCNC: 110 MMOL/L (ref 100–111)
CO2 SERPL-SCNC: 29 MMOL/L (ref 21–32)
CREAT SERPL-MCNC: 0.83 MG/DL (ref 0.6–1.3)
DIFFERENTIAL METHOD BLD: ABNORMAL
EOSINOPHIL # BLD: 0.2 K/UL (ref 0–0.4)
EOSINOPHIL NFR BLD: 2 % (ref 0–5)
ERYTHROCYTE [DISTWIDTH] IN BLOOD BY AUTOMATED COUNT: 12.9 % (ref 11.6–14.5)
GLOBULIN SER CALC-MCNC: 3.4 G/DL (ref 2–4)
GLUCOSE SERPL-MCNC: 101 MG/DL (ref 74–99)
HCG SERPL QL: NEGATIVE
HCT VFR BLD AUTO: 35.3 % (ref 35–45)
HGB BLD-MCNC: 11.7 G/DL (ref 12–16)
IMM GRANULOCYTES # BLD AUTO: 0 K/UL (ref 0–0.04)
IMM GRANULOCYTES NFR BLD AUTO: 0 % (ref 0–0.5)
LYMPHOCYTES # BLD: 2.9 K/UL (ref 0.9–3.6)
LYMPHOCYTES NFR BLD: 25 % (ref 21–52)
MCH RBC QN AUTO: 29.1 PG (ref 24–34)
MCHC RBC AUTO-ENTMCNC: 33.1 G/DL (ref 31–37)
MCV RBC AUTO: 87.8 FL (ref 78–100)
MONOCYTES # BLD: 1 K/UL (ref 0.05–1.2)
MONOCYTES NFR BLD: 9 % (ref 3–10)
NEUTS SEG # BLD: 7.4 K/UL (ref 1.8–8)
NEUTS SEG NFR BLD: 64 % (ref 40–73)
NRBC # BLD: 0 K/UL (ref 0–0.01)
NRBC BLD-RTO: 0 PER 100 WBC
PLATELET # BLD AUTO: 258 K/UL (ref 135–420)
PMV BLD AUTO: 10.9 FL (ref 9.2–11.8)
POTASSIUM SERPL-SCNC: 3.6 MMOL/L (ref 3.5–5.5)
PROT SERPL-MCNC: 6.9 G/DL (ref 6.4–8.2)
RBC # BLD AUTO: 4.02 M/UL (ref 4.2–5.3)
SODIUM SERPL-SCNC: 140 MMOL/L (ref 136–145)
WBC # BLD AUTO: 11.6 K/UL (ref 4.6–13.2)

## 2024-02-13 PROCEDURE — 85025 COMPLETE CBC W/AUTO DIFF WBC: CPT

## 2024-02-13 PROCEDURE — 96374 THER/PROPH/DIAG INJ IV PUSH: CPT

## 2024-02-13 PROCEDURE — 80053 COMPREHEN METABOLIC PANEL: CPT

## 2024-02-13 PROCEDURE — 99285 EMERGENCY DEPT VISIT HI MDM: CPT

## 2024-02-13 PROCEDURE — 6360000002 HC RX W HCPCS: Performed by: EMERGENCY MEDICINE

## 2024-02-13 PROCEDURE — 74177 CT ABD & PELVIS W/CONTRAST: CPT

## 2024-02-13 PROCEDURE — 2580000003 HC RX 258: Performed by: EMERGENCY MEDICINE

## 2024-02-13 PROCEDURE — 84703 CHORIONIC GONADOTROPIN ASSAY: CPT

## 2024-02-13 PROCEDURE — 96375 TX/PRO/DX INJ NEW DRUG ADDON: CPT

## 2024-02-13 RX ORDER — KETOROLAC TROMETHAMINE 15 MG/ML
15 INJECTION, SOLUTION INTRAMUSCULAR; INTRAVENOUS
Status: COMPLETED | OUTPATIENT
Start: 2024-02-13 | End: 2024-02-13

## 2024-02-13 RX ORDER — 0.9 % SODIUM CHLORIDE 0.9 %
1000 INTRAVENOUS SOLUTION INTRAVENOUS ONCE
Status: COMPLETED | OUTPATIENT
Start: 2024-02-14 | End: 2024-02-14

## 2024-02-13 RX ORDER — ONDANSETRON 2 MG/ML
4 INJECTION INTRAMUSCULAR; INTRAVENOUS
Status: COMPLETED | OUTPATIENT
Start: 2024-02-13 | End: 2024-02-13

## 2024-02-13 RX ORDER — MORPHINE SULFATE 4 MG/ML
4 INJECTION, SOLUTION INTRAMUSCULAR; INTRAVENOUS
Status: COMPLETED | OUTPATIENT
Start: 2024-02-13 | End: 2024-02-13

## 2024-02-13 RX ADMIN — MORPHINE SULFATE 4 MG: 4 INJECTION, SOLUTION INTRAMUSCULAR; INTRAVENOUS at 23:26

## 2024-02-13 RX ADMIN — ONDANSETRON 4 MG: 2 INJECTION INTRAMUSCULAR; INTRAVENOUS at 23:26

## 2024-02-13 RX ADMIN — SODIUM CHLORIDE 1000 ML: 9 INJECTION, SOLUTION INTRAVENOUS at 23:44

## 2024-02-13 RX ADMIN — KETOROLAC TROMETHAMINE 15 MG: 15 INJECTION, SOLUTION INTRAMUSCULAR; INTRAVENOUS at 23:26

## 2024-02-14 VITALS
DIASTOLIC BLOOD PRESSURE: 69 MMHG | WEIGHT: 246 LBS | OXYGEN SATURATION: 96 % | TEMPERATURE: 98.3 F | HEART RATE: 57 BPM | BODY MASS INDEX: 35.22 KG/M2 | RESPIRATION RATE: 18 BRPM | SYSTOLIC BLOOD PRESSURE: 107 MMHG | HEIGHT: 70 IN

## 2024-02-14 PROCEDURE — 6360000004 HC RX CONTRAST MEDICATION: Performed by: EMERGENCY MEDICINE

## 2024-02-14 RX ORDER — OXYCODONE HYDROCHLORIDE AND ACETAMINOPHEN 5; 325 MG/1; MG/1
1 TABLET ORAL EVERY 6 HOURS PRN
Qty: 12 TABLET | Refills: 0 | Status: SHIPPED | OUTPATIENT
Start: 2024-02-14 | End: 2024-02-17

## 2024-02-14 RX ORDER — IBUPROFEN 800 MG/1
800 TABLET ORAL EVERY 8 HOURS
Qty: 9 TABLET | Refills: 0 | Status: SHIPPED | OUTPATIENT
Start: 2024-02-14 | End: 2024-02-17

## 2024-02-14 RX ADMIN — IOPAMIDOL 100 ML: 612 INJECTION, SOLUTION INTRAVENOUS at 00:43

## 2024-02-14 NOTE — ED PROVIDER NOTES
Substances: Nicotine   Substance Use Topics    Alcohol use: Not Currently    Drug use: Never       Allergies:  Allergies   Allergen Reactions    Reglan [Metoclopramide] Hallucinations    Zoloft [Sertraline] Hives         Review of Systems     Review of Systems      Physical Exam       Patient Vitals for the past 12 hrs:   Temp Pulse Resp BP SpO2   02/13/24 2346 -- -- -- 111/67 94 %   02/13/24 2315 -- -- -- 121/65 97 %   02/13/24 2242 98.3 °F (36.8 °C) 85 18 126/65 98 %       IPVITALS  Patient Vitals for the past 24 hrs:   BP Temp Temp src Pulse Resp SpO2 Height Weight   02/13/24 2346 111/67 -- -- -- -- 94 % -- --   02/13/24 2315 121/65 -- -- -- -- 97 % -- --   02/13/24 2242 126/65 98.3 °F (36.8 °C) Oral 85 18 98 % 1.778 m (5' 10\") 111.6 kg (246 lb)       Physical Exam  Constitutional:       General: She is in acute distress.      Appearance: Normal appearance. She is obese.   HENT:      Head: Normocephalic and atraumatic.   Cardiovascular:      Rate and Rhythm: Normal rate and regular rhythm.   Pulmonary:      Effort: Pulmonary effort is normal.      Breath sounds: Normal breath sounds.   Abdominal:      Palpations: There is no mass.      Tenderness: There is abdominal tenderness (Right lower quadrant).      Hernia: No hernia is present.   Musculoskeletal:         General: Normal range of motion.      Cervical back: Normal range of motion and neck supple.   Skin:     General: Skin is warm and dry.   Neurological:      General: No focal deficit present.      Mental Status: She is alert.           Diagnostic Study Results   Labs -  Recent Results (from the past 24 hour(s))   CBC with Auto Differential    Collection Time: 02/13/24 11:18 PM   Result Value Ref Range    WBC 11.6 4.6 - 13.2 K/uL    RBC 4.02 (L) 4.20 - 5.30 M/uL    Hemoglobin 11.7 (L) 12.0 - 16.0 g/dL    Hematocrit 35.3 35.0 - 45.0 %    MCV 87.8 78.0 - 100.0 FL    MCH 29.1 24.0 - 34.0 PG    MCHC 33.1 31.0 - 37.0 g/dL    RDW 12.9 11.6 - 14.5 %

## 2024-02-14 NOTE — ED TRIAGE NOTES
Pt here c/o continued pelvic pain and vaginal bleeding. Diagnosed with ruptured ovarian cyst on Sunday; was instructed to follow up with GYN asap, but can't get in until April. States pain is worse today.

## 2024-02-14 NOTE — ED NOTES
Discharge teaching provided to patient regarding treatment received,medications prescribed, and proper follow-up care. Patient verbalized understanding directions and follow up care. Patient left ambulatory with discharge paperwork in hand.  Boyfriend to drive her home

## 2024-06-16 ENCOUNTER — HOSPITAL ENCOUNTER (EMERGENCY)
Facility: HOSPITAL | Age: 24
Discharge: HOME | End: 2024-06-17
Attending: EMERGENCY MEDICINE
Payer: COMMERCIAL

## 2024-06-16 ENCOUNTER — APPOINTMENT (OUTPATIENT)
Facility: HOSPITAL | Age: 24
End: 2024-06-16
Payer: COMMERCIAL

## 2024-06-16 VITALS
RESPIRATION RATE: 16 BRPM | DIASTOLIC BLOOD PRESSURE: 109 MMHG | HEIGHT: 70 IN | BODY MASS INDEX: 34.36 KG/M2 | HEART RATE: 105 BPM | WEIGHT: 240 LBS | OXYGEN SATURATION: 99 % | TEMPERATURE: 98.1 F | SYSTOLIC BLOOD PRESSURE: 151 MMHG

## 2024-06-16 DIAGNOSIS — J01.00 ACUTE MAXILLARY SINUSITIS, RECURRENCE NOT SPECIFIED: ICD-10-CM

## 2024-06-16 DIAGNOSIS — S01.81XA FACIAL LACERATION, INITIAL ENCOUNTER: Primary | ICD-10-CM

## 2024-06-16 DIAGNOSIS — K02.9 DENTAL CARIES: ICD-10-CM

## 2024-06-16 PROCEDURE — 70486 CT MAXILLOFACIAL W/O DYE: CPT

## 2024-06-16 PROCEDURE — 12013 RPR F/E/E/N/L/M 2.6-5.0 CM: CPT

## 2024-06-16 PROCEDURE — 99284 EMERGENCY DEPT VISIT MOD MDM: CPT

## 2024-06-16 PROCEDURE — 6370000000 HC RX 637 (ALT 250 FOR IP): Performed by: EMERGENCY MEDICINE

## 2024-06-16 PROCEDURE — 70450 CT HEAD/BRAIN W/O DYE: CPT

## 2024-06-16 PROCEDURE — 72125 CT NECK SPINE W/O DYE: CPT

## 2024-06-16 PROCEDURE — 6370000000 HC RX 637 (ALT 250 FOR IP)

## 2024-06-16 RX ORDER — OXYCODONE HYDROCHLORIDE AND ACETAMINOPHEN 5; 325 MG/1; MG/1
1 TABLET ORAL
Status: COMPLETED | OUTPATIENT
Start: 2024-06-17 | End: 2024-06-17

## 2024-06-16 RX ORDER — IBUPROFEN 600 MG/1
600 TABLET ORAL
Status: COMPLETED | OUTPATIENT
Start: 2024-06-17 | End: 2024-06-17

## 2024-06-16 RX ORDER — IBUPROFEN 600 MG/1
600 TABLET ORAL EVERY 6 HOURS PRN
Qty: 30 TABLET | Refills: 0 | Status: SHIPPED | OUTPATIENT
Start: 2024-06-16

## 2024-06-16 RX ORDER — OXYCODONE HYDROCHLORIDE AND ACETAMINOPHEN 5; 325 MG/1; MG/1
1 TABLET ORAL EVERY 6 HOURS PRN
Qty: 10 TABLET | Refills: 0 | Status: SHIPPED | OUTPATIENT
Start: 2024-06-16 | End: 2024-06-19

## 2024-06-16 RX ORDER — AMOXICILLIN AND CLAVULANATE POTASSIUM 875; 125 MG/1; MG/1
1 TABLET, FILM COATED ORAL 2 TIMES DAILY
Qty: 20 TABLET | Refills: 0 | Status: SHIPPED | OUTPATIENT
Start: 2024-06-16 | End: 2024-06-26

## 2024-06-16 RX ORDER — AMOXICILLIN AND CLAVULANATE POTASSIUM 875; 125 MG/1; MG/1
1 TABLET, FILM COATED ORAL
Status: COMPLETED | OUTPATIENT
Start: 2024-06-17 | End: 2024-06-17

## 2024-06-16 RX ORDER — ONDANSETRON 4 MG/1
4 TABLET, ORALLY DISINTEGRATING ORAL
Status: COMPLETED | OUTPATIENT
Start: 2024-06-16 | End: 2024-06-16

## 2024-06-16 RX ORDER — HYDROCODONE BITARTRATE AND ACETAMINOPHEN 5; 325 MG/1; MG/1
1 TABLET ORAL
Status: COMPLETED | OUTPATIENT
Start: 2024-06-16 | End: 2024-06-16

## 2024-06-16 RX ORDER — LIDOCAINE HYDROCHLORIDE AND EPINEPHRINE 10; 10 MG/ML; UG/ML
10 INJECTION, SOLUTION INFILTRATION; PERINEURAL
Status: DISCONTINUED | OUTPATIENT
Start: 2024-06-16 | End: 2024-06-17 | Stop reason: HOSPADM

## 2024-06-16 RX ADMIN — ONDANSETRON 4 MG: 4 TABLET, ORALLY DISINTEGRATING ORAL at 21:36

## 2024-06-16 RX ADMIN — HYDROCODONE BITARTRATE AND ACETAMINOPHEN 1 TABLET: 5; 325 TABLET ORAL at 21:35

## 2024-06-16 ASSESSMENT — PAIN SCALES - GENERAL: PAINLEVEL_OUTOF10: 10

## 2024-06-17 PROCEDURE — 90471 IMMUNIZATION ADMIN: CPT | Performed by: EMERGENCY MEDICINE

## 2024-06-17 PROCEDURE — 90714 TD VACC NO PRESV 7 YRS+ IM: CPT | Performed by: EMERGENCY MEDICINE

## 2024-06-17 PROCEDURE — 6360000002 HC RX W HCPCS: Performed by: EMERGENCY MEDICINE

## 2024-06-17 PROCEDURE — 6370000000 HC RX 637 (ALT 250 FOR IP): Performed by: PHYSICIAN ASSISTANT

## 2024-06-17 RX ADMIN — CLOSTRIDIUM TETANI TOXOID ANTIGEN (FORMALDEHYDE INACTIVATED) AND CORYNEBACTERIUM DIPHTHERIAE TOXOID ANTIGEN (FORMALDEHYDE INACTIVATED) 0.5 ML: 5; 2 INJECTION, SUSPENSION INTRAMUSCULAR at 00:25

## 2024-06-17 RX ADMIN — IBUPROFEN 600 MG: 600 TABLET, FILM COATED ORAL at 00:30

## 2024-06-17 RX ADMIN — OXYCODONE HYDROCHLORIDE AND ACETAMINOPHEN 1 TABLET: 5; 325 TABLET ORAL at 00:30

## 2024-06-17 RX ADMIN — AMOXICILLIN AND CLAVULANATE POTASSIUM 1 TABLET: 875; 125 TABLET, FILM COATED ORAL at 00:30

## 2024-06-17 ASSESSMENT — ENCOUNTER SYMPTOMS
SORE THROAT: 0
EYE DISCHARGE: 0
NAUSEA: 0
WHEEZING: 0
RHINORRHEA: 0
BACK PAIN: 0
EYE REDNESS: 0
FACIAL SWELLING: 1
STRIDOR: 0
COUGH: 0
VOMITING: 0
ABDOMINAL PAIN: 0
SHORTNESS OF BREATH: 0

## 2024-06-17 ASSESSMENT — PAIN DESCRIPTION - ORIENTATION: ORIENTATION: LEFT

## 2024-06-17 ASSESSMENT — PAIN DESCRIPTION - LOCATION: LOCATION: FACE

## 2024-06-17 ASSESSMENT — PAIN SCALES - GENERAL: PAINLEVEL_OUTOF10: 8

## 2024-06-17 ASSESSMENT — PAIN DESCRIPTION - DESCRIPTORS: DESCRIPTORS: STABBING;GNAWING

## 2024-06-17 NOTE — ED NOTES
Patient for discharge home in no acute distress, discharge instructions given  and patient verbalized understanding and will follow up as directed.  Given pain medications as ordered.
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
## 2024-06-17 NOTE — ED PROVIDER NOTES
Labs -   No results found for this or any previous visit (from the past 12 hour(s)).    Radiologic Studies -   CT Head W/O Contrast   Final Result   1.  No acute intracranial hemorrhage or acute large territorial infarct.            Electronically signed by Madison Wu      CT CSpine W/O Contrast   Final Result   1.  No acute fracture or subluxation of the cervical spine. Multilevel   degenerative changes in the cervical spine.               Electronically signed by Madison Wu      CT MAXILLOFACIAL WO CONTRAST   Final Result   1.  No acute facial or orbital fracture. No acute posttraumatic abnormalities.   2.  Bilateral maxillary sinus mucosal thickening which are related to bilateral   maxillary molar roots-indicative of odontogenic sinusitis.      Electronically signed by Madison Wu        [unfilled]  [unfilled]      Medical Decision Making   I am the first provider for this patient.    I reviewed the vital signs, available nursing notes, past medical history, past surgical history, family history and social history.    Vital Signs-Reviewed the patient's vital signs.    Records Reviewed: Karly Solis PA-C     Procedures:  Lac Repair    Date/Time: 6/17/2024 12:00 AM    Performed by: Karly Solis PA-C  Authorized by: Micki Alves MD    Consent:     Consent obtained:  Verbal    Consent given by:  Patient    Risks, benefits, and alternatives were discussed: yes      Risks discussed:  Infection, pain and poor cosmetic result    Alternatives discussed:  Referral  Gladys protocol:     Procedure explained and questions answered to patient or proxy's satisfaction: yes      Relevant documents present and verified: yes      Test results available: no      Imaging studies available: yes      Required blood products, implants, devices, and special equipment available: yes      Immediately prior to procedure, a time out was called: yes      Patient identity confirmed:  Verbally with

## 2024-06-24 ENCOUNTER — HOSPITAL ENCOUNTER (EMERGENCY)
Facility: HOSPITAL | Age: 24
Discharge: HOME | End: 2024-06-24

## 2024-06-24 VITALS
BODY MASS INDEX: 32.93 KG/M2 | WEIGHT: 230 LBS | SYSTOLIC BLOOD PRESSURE: 134 MMHG | RESPIRATION RATE: 18 BRPM | OXYGEN SATURATION: 98 % | DIASTOLIC BLOOD PRESSURE: 83 MMHG | HEART RATE: 82 BPM | TEMPERATURE: 98.2 F | HEIGHT: 70 IN

## 2024-06-24 DIAGNOSIS — S01.81XD FACIAL LACERATION, SUBSEQUENT ENCOUNTER: ICD-10-CM

## 2024-06-24 DIAGNOSIS — Z48.02 ENCOUNTER FOR REMOVAL OF SUTURES: Primary | ICD-10-CM

## 2024-06-24 ASSESSMENT — PAIN - FUNCTIONAL ASSESSMENT: PAIN_FUNCTIONAL_ASSESSMENT: NONE - DENIES PAIN

## 2024-06-24 ASSESSMENT — ENCOUNTER SYMPTOMS: ROS SKIN COMMENTS: SUTURE REMOVAL

## 2024-06-24 NOTE — ED PROVIDER NOTES
film images such as CT, Ultrasound and MRI are read by the radiologist. Plain radiographic images are visualized and preliminarily interpreted by the emergency physician with the below findings:    Radiologic Studies -   No orders to display       The laboratory results, imaging results, and other diagnostic exams were reviewed in the EMR.    Medical Decision Making   I am the first provider for this patient.    I reviewed the vital signs, available nursing notes, past medical history, past surgical history, family history and social history.    Vital Signs-Reviewed the patient's vital signs.    Records Reviewed: Nursing Notes and Old Medical Records Personally, on initial evaluation (Time of Review: 9:21 AM)      ED Course: Progress Notes, Reevaluation, and Consults:  24-year-old female presents to the emergency department with reports of needing her sutures removed.  Patient had sutures placed June 16.        Procedures:  Suture Removal    Date/Time: 6/24/2024 9:12 AM    Performed by: Nery Braga APRN - NP  Authorized by: Nery Braga APRN - NP    Consent:     Consent obtained:  Verbal    Consent given by:  Patient    Risks, benefits, and alternatives were discussed: yes      Risks discussed:  Wound separation  Universal protocol:     Procedure explained and questions answered to patient or proxy's satisfaction: yes      Patient identity confirmed:  Verbally with patient and arm band  Location:     Location:  Head/neck    Head/neck location:  Eyebrow    Eyebrow location:  L eyebrow  Procedure details:     Wound appearance:  No signs of infection, good wound healing, clean, nontender and nonpurulent    Number of sutures removed:  4  Post-procedure details:     Post-removal:  Antibiotic ointment applied    Procedure completion:  Tolerated well, no immediate complications  Comments:      2 sutures removed from upper lip, sutures trimmed from inner lip as requested by patient.         Care plan outlined

## 2024-08-24 ENCOUNTER — APPOINTMENT (OUTPATIENT)
Facility: HOSPITAL | Age: 24
End: 2024-08-24
Payer: COMMERCIAL

## 2024-08-24 ENCOUNTER — HOSPITAL ENCOUNTER (EMERGENCY)
Facility: HOSPITAL | Age: 24
Discharge: HOME | End: 2024-08-24
Payer: COMMERCIAL

## 2024-08-24 VITALS
HEIGHT: 70 IN | WEIGHT: 230 LBS | TEMPERATURE: 98 F | BODY MASS INDEX: 32.93 KG/M2 | OXYGEN SATURATION: 100 % | DIASTOLIC BLOOD PRESSURE: 82 MMHG | SYSTOLIC BLOOD PRESSURE: 125 MMHG | HEART RATE: 76 BPM | RESPIRATION RATE: 17 BRPM

## 2024-08-24 DIAGNOSIS — M79.641 RIGHT HAND PAIN: Primary | ICD-10-CM

## 2024-08-24 PROCEDURE — 73120 X-RAY EXAM OF HAND: CPT

## 2024-08-24 PROCEDURE — 99283 EMERGENCY DEPT VISIT LOW MDM: CPT

## 2024-08-24 PROCEDURE — 6370000000 HC RX 637 (ALT 250 FOR IP): Performed by: PHYSICIAN ASSISTANT

## 2024-08-24 RX ORDER — NAPROXEN 250 MG/1
500 TABLET ORAL
Status: COMPLETED | OUTPATIENT
Start: 2024-08-24 | End: 2024-08-24

## 2024-08-24 RX ORDER — NAPROXEN 500 MG/1
500 TABLET ORAL 2 TIMES DAILY
Qty: 14 TABLET | Refills: 0 | Status: SHIPPED | OUTPATIENT
Start: 2024-08-24 | End: 2024-08-31

## 2024-08-24 RX ADMIN — NAPROXEN 500 MG: 250 TABLET ORAL at 08:27

## 2024-08-24 ASSESSMENT — ENCOUNTER SYMPTOMS
ABDOMINAL PAIN: 0
NAUSEA: 0
VOMITING: 0
SHORTNESS OF BREATH: 0

## 2024-08-24 ASSESSMENT — PAIN DESCRIPTION - ORIENTATION: ORIENTATION: RIGHT

## 2024-08-24 ASSESSMENT — PAIN SCALES - GENERAL: PAINLEVEL_OUTOF10: 8

## 2024-08-24 ASSESSMENT — PAIN DESCRIPTION - LOCATION: LOCATION: HAND

## 2024-08-24 ASSESSMENT — PAIN - FUNCTIONAL ASSESSMENT
PAIN_FUNCTIONAL_ASSESSMENT: 0-10
PAIN_FUNCTIONAL_ASSESSMENT: NONE - DENIES PAIN

## 2024-08-24 NOTE — DISCHARGE INSTRUCTIONS
Take medication as prescribed. Follow-up with your primary care physician within 2 days for reassessment. Bring the results from this visit with you for their review. Return to the ED immediately for any new, worsening, or persistent symptoms, including numbness/tingling, increased swelling, or any other medical concerns.

## 2024-08-24 NOTE — ED TRIAGE NOTES
Patient presented to the Emergency Dept with a complaint right arm pain and having bruising  which she noticed last night after turning her door nob.  Patient states that for the last 3 or 4 days was doing some power washing at home    Patient rates pain 8/10 on pain scale.     Patient alert and oriented x 4, patient breathes freely on room air in nil cardiopulmonary distress

## 2024-08-24 NOTE — ED PROVIDER NOTES
EMERGENCY DEPARTMENT HISTORY AND PHYSICAL EXAM    9:37 AM      Date: 8/24/2024  Patient Name: Maria G Jasmine    History of Presenting Illness     Chief Complaint   Patient presents with    Hand Pain         History Provided By: Patient    Additional History (Context): Maria G Jasmine is a 24 y.o. female with   Past Medical History:   Diagnosis Date    Migraine     PID (acute pelvic inflammatory disease)    } who presents with complaint of right hand pain x 3 days.  Patient notes she has been power washing in her house, notes she went to turn a doorknob last night and noticed pain increased and is associated with ecchymosis.  Patient's pain is worse with movement and palpation.  Patient she has tried over-the-counter medication for symptoms with mild relief.  Patient denies wound, fall, numbness or tingling, reduced range of motion of digits.    PCP: No primary care provider on file.    No current facility-administered medications for this encounter.     Current Outpatient Medications   Medication Sig Dispense Refill    naproxen (NAPROSYN) 500 MG tablet Take 1 tablet by mouth 2 times daily for 7 days 14 tablet 0       Past History     Past Medical History:  Past Medical History:   Diagnosis Date    Migraine     PID (acute pelvic inflammatory disease)        Past Surgical History:  No past surgical history on file.    Family History:  No family history on file.    Social History:  Social History     Tobacco Use    Smoking status: Never    Smokeless tobacco: Never   Vaping Use    Vaping status: Every Day    Substances: Nicotine   Substance Use Topics    Alcohol use: Not Currently    Drug use: Never       Allergies:  Allergies   Allergen Reactions    Promethazine Hallucinations    Reglan [Metoclopramide] Hallucinations    Zoloft [Sertraline] Hives         Review of Systems       Review of Systems   Constitutional:  Negative for chills and fever.   Respiratory:  Negative for shortness of breath.    Cardiovascular:   Negative for chest pain.   Gastrointestinal:  Negative for abdominal pain, nausea and vomiting.   Musculoskeletal:  Positive for arthralgias, joint swelling and myalgias.   Skin:  Negative for rash.   All other systems reviewed and are negative.        Physical Exam   /82   Pulse 76   Temp 98 °F (36.7 °C) (Oral)   Resp 17   Ht 1.778 m (5' 10\")   Wt 104.3 kg (230 lb)   SpO2 100%   BMI 33.00 kg/m²       Physical Exam  Vitals and nursing note reviewed.   Constitutional:       General: She is not in acute distress.     Appearance: Normal appearance. She is not ill-appearing, toxic-appearing or diaphoretic.   HENT:      Head: Normocephalic and atraumatic.   Cardiovascular:      Rate and Rhythm: Normal rate and regular rhythm.   Pulmonary:      Effort: Pulmonary effort is normal.      Breath sounds: Normal breath sounds.   Musculoskeletal:         General: Normal range of motion.      Right wrist: Normal.      Right hand: Bony tenderness (5th metacarpal TTP, mild edema and ecchymosis) present. No swelling or deformity. Normal range of motion. Normal strength. Normal sensation. There is no disruption of two-point discrimination. Normal capillary refill. Normal pulse.      Cervical back: Normal range of motion and neck supple.      Comments:  strength 5/5, radial pulse 2+    Skin:     General: Skin is warm.      Findings: No rash.   Neurological:      General: No focal deficit present.      Mental Status: She is alert and oriented to person, place, and time.      Cranial Nerves: No cranial nerve deficit.      Sensory: No sensory deficit.      Motor: No weakness.           Diagnostic Study Results     Labs -  No results found for this or any previous visit (from the past 12 hour(s)).    Radiologic Studies -   XR HAND RIGHT (2 VIEWS)   Final Result      No acute osseous abnormality.            Electronically signed by Thomas Banks            Medical Decision Making   I am the first provider for this